# Patient Record
Sex: FEMALE | Race: BLACK OR AFRICAN AMERICAN | NOT HISPANIC OR LATINO | URBAN - METROPOLITAN AREA
[De-identification: names, ages, dates, MRNs, and addresses within clinical notes are randomized per-mention and may not be internally consistent; named-entity substitution may affect disease eponyms.]

---

## 2018-01-24 ENCOUNTER — OFFICE (OUTPATIENT)
Dept: URBAN - METROPOLITAN AREA CLINIC 11 | Facility: CLINIC | Age: 63
End: 2018-01-24

## 2018-01-24 VITALS
SYSTOLIC BLOOD PRESSURE: 142 MMHG | WEIGHT: 192 LBS | DIASTOLIC BLOOD PRESSURE: 86 MMHG | HEIGHT: 61 IN | HEART RATE: 76 BPM

## 2018-01-24 DIAGNOSIS — K20.9 ESOPHAGITIS, UNSPECIFIED: ICD-10-CM

## 2018-01-24 DIAGNOSIS — D64.9 ANEMIA, UNSPECIFIED: ICD-10-CM

## 2018-01-24 DIAGNOSIS — K64.8 OTHER HEMORRHOIDS: ICD-10-CM

## 2018-01-24 LAB
CBC, PLATELET, NO DIFFERENTIAL: HEMATOCRIT: 33 % — LOW (ref 34–46.6)
CBC, PLATELET, NO DIFFERENTIAL: HEMOGLOBIN: 10.4 G/DL — LOW (ref 11.1–15.9)
CBC, PLATELET, NO DIFFERENTIAL: MCH: 28.1 PG (ref 26.6–33)
CBC, PLATELET, NO DIFFERENTIAL: MCHC: 31.5 G/DL (ref 31.5–35.7)
CBC, PLATELET, NO DIFFERENTIAL: MCV: 89 FL (ref 79–97)
CBC, PLATELET, NO DIFFERENTIAL: PLATELETS: 203 X10E3/UL (ref 150–379)
CBC, PLATELET, NO DIFFERENTIAL: RBC: 3.7 X10E6/UL — LOW (ref 3.77–5.28)
CBC, PLATELET, NO DIFFERENTIAL: RDW: 18.3 % — HIGH (ref 12.3–15.4)
CBC, PLATELET, NO DIFFERENTIAL: WBC: 7 X10E3/UL (ref 3.4–10.8)
FE+TIBC+FER: FERRITIN, SERUM: 145 NG/ML (ref 15–150)
FE+TIBC+FER: IRON BIND.CAP.(TIBC): 368 UG/DL (ref 250–450)
FE+TIBC+FER: IRON SATURATION: 21 % (ref 15–55)
FE+TIBC+FER: IRON, SERUM: 76 UG/DL (ref 27–139)
FE+TIBC+FER: UIBC: 292 UG/DL (ref 118–369)

## 2018-01-24 PROCEDURE — 99213 OFFICE O/P EST LOW 20 MIN: CPT | Performed by: INTERNAL MEDICINE

## 2024-07-23 ENCOUNTER — OFFICE (OUTPATIENT)
Dept: URBAN - METROPOLITAN AREA CLINIC 9 | Facility: CLINIC | Age: 69
End: 2024-07-23
Payer: COMMERCIAL

## 2024-07-23 VITALS
SYSTOLIC BLOOD PRESSURE: 166 MMHG | HEART RATE: 71 BPM | OXYGEN SATURATION: 97 % | HEIGHT: 61 IN | DIASTOLIC BLOOD PRESSURE: 90 MMHG | SYSTOLIC BLOOD PRESSURE: 165 MMHG | WEIGHT: 164 LBS | DIASTOLIC BLOOD PRESSURE: 94 MMHG

## 2024-07-23 DIAGNOSIS — I82.409 ACUTE EMBOLISM AND THROMBOSIS OF UNSPECIFIED DEEP VEINS OF U: ICD-10-CM

## 2024-07-23 DIAGNOSIS — D50.9 IRON DEFICIENCY ANEMIA, UNSPECIFIED: ICD-10-CM

## 2024-07-23 DIAGNOSIS — K21.9 GASTRO-ESOPHAGEAL REFLUX DISEASE WITHOUT ESOPHAGITIS: ICD-10-CM

## 2024-07-23 DIAGNOSIS — R11.10 VOMITING, UNSPECIFIED: ICD-10-CM

## 2024-07-23 PROCEDURE — 99203 OFFICE O/P NEW LOW 30 MIN: CPT | Performed by: NURSE PRACTITIONER

## 2024-07-23 RX ORDER — PANTOPRAZOLE 40 MG/1
40 TABLET, DELAYED RELEASE ORAL
Qty: 60 | Refills: 6 | Status: ACTIVE

## 2024-07-23 NOTE — SERVICENOTES
Avoid NSAID's. Will avoid scheduling EGD at this time due to recent RLE DVT, not on anticoagulation. She will need to be seen by hematology and we will need hematology clearance prior to scheduling EGD.

## 2024-07-23 NOTE — SERVICEHPINOTES
Ms. Resendiz presents today with chief complaint of vomiting. She reports "when I get full, I have to go throw up". She reports that the vomiting occurs approximately once a week for the past one year. She denies any nausea. She reports "I feel weak after I throw up". She denies any diarrhea, constipation, hematochezia or melena. She reports that her GERD symptoms are controlled with Pantoprazole. She denies any dysphagia. She denies any NSAID use. She has a history of a SHANI and underwent a balloon enteroscopy with Dr. Benjamin in 2016. She reports that she has had no further issues with SHANI since 2018 when she was last seen in our office. She was recommended to have repeat capsule endoscopy and a referral to hematology in 2018. She reports that she was seen by hematology at that time, although records indicate that she did not show for her appt with hematology. She reports that she has not followed with hematology is several years. She reports a recent ER visit at [facility] for a DVT to the RLE in 05/2024. She reports that she is not currently on anticoagulation. She states "I took the blood thinners that they gave me, but I stopped when I ran out". She reports that she has been referred to a new hematologist for the new DVT.

## 2024-07-29 LAB
CBC WITH DIFFERENTIAL/PLATELET: BASO (ABSOLUTE): 0 X10E3/UL
CBC WITH DIFFERENTIAL/PLATELET: BASOS: 1 %
CBC WITH DIFFERENTIAL/PLATELET: EOS (ABSOLUTE): 0.2 X10E3/UL
CBC WITH DIFFERENTIAL/PLATELET: EOS: 4 %
CBC WITH DIFFERENTIAL/PLATELET: HEMATOCRIT: 34.7 %
CBC WITH DIFFERENTIAL/PLATELET: HEMATOLOGY COMMENTS: (no result)
CBC WITH DIFFERENTIAL/PLATELET: HEMOGLOBIN: 11 G/DL
CBC WITH DIFFERENTIAL/PLATELET: IMMATURE CELLS: (no result)
CBC WITH DIFFERENTIAL/PLATELET: IMMATURE GRANS (ABS): 0 X10E3/UL
CBC WITH DIFFERENTIAL/PLATELET: IMMATURE GRANULOCYTES: 0 %
CBC WITH DIFFERENTIAL/PLATELET: LYMPHS (ABSOLUTE): 1.6 X10E3/UL
CBC WITH DIFFERENTIAL/PLATELET: LYMPHS: 32 %
CBC WITH DIFFERENTIAL/PLATELET: MCH: 28 PG
CBC WITH DIFFERENTIAL/PLATELET: MCHC: 31.7 G/DL
CBC WITH DIFFERENTIAL/PLATELET: MCV: 88 FL
CBC WITH DIFFERENTIAL/PLATELET: MONOCYTES(ABSOLUTE): 0.6 X10E3/UL
CBC WITH DIFFERENTIAL/PLATELET: MONOCYTES: 11 %
CBC WITH DIFFERENTIAL/PLATELET: NEUTROPHILS (ABSOLUTE): 2.6 X10E3/UL
CBC WITH DIFFERENTIAL/PLATELET: NEUTROPHILS: 52 %
CBC WITH DIFFERENTIAL/PLATELET: NRBC: (no result)
CBC WITH DIFFERENTIAL/PLATELET: PLATELETS: 206 X10E3/UL (ref 150–450)
CBC WITH DIFFERENTIAL/PLATELET: RBC: 3.93 X10E6/UL
CBC WITH DIFFERENTIAL/PLATELET: RDW: 14.5 %
CBC WITH DIFFERENTIAL/PLATELET: WBC: 5 X10E3/UL (ref 3.4–10.8)
FERRITIN: 59 NG/ML
IRON AND TIBC: IRON BIND.CAP.(TIBC): 331 UG/DL (ref 250–450)
IRON AND TIBC: IRON SATURATION: 41 % (ref 15–55)
IRON AND TIBC: IRON: 137 UG/DL
IRON AND TIBC: UIBC: 194 UG/DL